# Patient Record
Sex: FEMALE | Race: WHITE | NOT HISPANIC OR LATINO | Employment: FULL TIME | URBAN - METROPOLITAN AREA
[De-identification: names, ages, dates, MRNs, and addresses within clinical notes are randomized per-mention and may not be internally consistent; named-entity substitution may affect disease eponyms.]

---

## 2019-12-18 ENCOUNTER — HOSPITAL ENCOUNTER (EMERGENCY)
Facility: HOSPITAL | Age: 39
Discharge: HOME | End: 2019-12-19
Attending: EMERGENCY MEDICINE
Payer: COMMERCIAL

## 2019-12-18 VITALS
TEMPERATURE: 98.1 F | DIASTOLIC BLOOD PRESSURE: 91 MMHG | RESPIRATION RATE: 18 BRPM | OXYGEN SATURATION: 99 % | SYSTOLIC BLOOD PRESSURE: 200 MMHG | WEIGHT: 272.71 LBS | HEART RATE: 90 BPM

## 2019-12-18 DIAGNOSIS — S09.90XA CLOSED HEAD INJURY, INITIAL ENCOUNTER: ICD-10-CM

## 2019-12-18 DIAGNOSIS — S01.01XA LACERATION OF SCALP, INITIAL ENCOUNTER: ICD-10-CM

## 2019-12-18 DIAGNOSIS — M54.2 NECK PAIN: ICD-10-CM
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
## 2019-12-18 PROCEDURE — 99284 EMERGENCY DEPT VISIT MOD MDM: CPT

## 2019-12-19 ENCOUNTER — APPOINTMENT (EMERGENCY)
Dept: CT IMAGING | Facility: HOSPITAL | Age: 39
End: 2019-12-19
Payer: COMMERCIAL

## 2019-12-19 LAB
EXT PREG TEST URINE: NEGATIVE
EXT. CONTROL ED NAV: NORMAL

## 2019-12-19 PROCEDURE — 70450 CT HEAD/BRAIN W/O DYE: CPT

## 2019-12-19 PROCEDURE — 81025 URINE PREGNANCY TEST: CPT | Performed by: EMERGENCY MEDICINE

## 2019-12-19 PROCEDURE — 72125 CT NECK SPINE W/O DYE: CPT

## 2019-12-19 PROCEDURE — 12002 RPR S/N/AX/GEN/TRNK2.6-7.5CM: CPT | Performed by: EMERGENCY MEDICINE

## 2019-12-19 PROCEDURE — 99284 EMERGENCY DEPT VISIT MOD MDM: CPT | Performed by: EMERGENCY MEDICINE

## 2019-12-19 RX ORDER — ACETAMINOPHEN 325 MG/1
975 TABLET ORAL ONCE
Status: COMPLETED | OUTPATIENT
Start: 2019-12-19 | End: 2019-12-19

## 2019-12-19 RX ORDER — ONDANSETRON 4 MG/1
4 TABLET, ORALLY DISINTEGRATING ORAL ONCE
Status: COMPLETED | OUTPATIENT
Start: 2019-12-19 | End: 2019-12-19

## 2019-12-19 RX ADMIN — ACETAMINOPHEN 975 MG: 325 TABLET ORAL at 00:52

## 2019-12-19 RX ADMIN — ONDANSETRON 4 MG: 4 TABLET, ORALLY DISINTEGRATING ORAL at 00:52

## 2019-12-19 NOTE — ED PROVIDER NOTES
History  Chief Complaint   Patient presents with    Fall     Pt states "slipped on Ice  hit back of head  saw stars  unsure of LOC"     Pt is a 41year old female presenting with fall x tonight  Pt states she slipped on ice and fell backwards striking her head on cement  Unsure if she had LOC but now has generalized headache and midline neck pain  She is wearing a collar at this time  She has a 3 cm laceration of her left occiput  No changes in vision, lightheadedness, dizziness, vomiting  No facial trauma, chest pain, SOB  Ambulating normally  None       History reviewed  No pertinent past medical history  History reviewed  No pertinent surgical history  History reviewed  No pertinent family history  I have reviewed and agree with the history as documented  Social History     Tobacco Use    Smoking status: Never Smoker    Smokeless tobacco: Never Used   Substance Use Topics    Alcohol use: Yes     Comment: social    Drug use: Not Currently        Review of Systems   Constitutional: Negative  HENT: Negative  Eyes: Negative for photophobia and visual disturbance  Respiratory: Negative  Cardiovascular: Negative  Gastrointestinal: Negative  Genitourinary: Negative  Musculoskeletal: Positive for neck pain  Negative for arthralgias, back pain and joint swelling  Skin: Positive for wound  Neurological: Positive for headaches  Negative for dizziness, syncope, facial asymmetry, speech difficulty, weakness, light-headedness and numbness  Physical Exam  Physical Exam   Constitutional: She is oriented to person, place, and time  She appears well-developed and well-nourished  No distress  Cervical collar in place  HENT:   Head: Normocephalic and atraumatic  Right Ear: External ear normal  No hemotympanum  Left Ear: External ear normal  No hemotympanum  Nose: Nose normal    Mouth/Throat: Oropharynx is clear and moist  No oropharyngeal exudate     Eyes: Pupils are equal, round, and reactive to light  Conjunctivae and EOM are normal    Neck: Neck supple  Spinous process tenderness present  No muscular tenderness present  Decreased range of motion present  No edema and no erythema present  Cardiovascular: Normal rate, regular rhythm, normal heart sounds and intact distal pulses  Pulmonary/Chest: Effort normal and breath sounds normal    Neurological: She is alert and oriented to person, place, and time  She has normal strength  No cranial nerve deficit or sensory deficit  She exhibits normal muscle tone  Coordination and gait normal  GCS eye subscore is 4  GCS verbal subscore is 5  GCS motor subscore is 6  Non-focal neuro exam    Skin: Skin is warm and dry  She is not diaphoretic  Vital Signs  ED Triage Vitals   Temperature Pulse Respirations Blood Pressure SpO2   12/18/19 2337 12/18/19 2338 12/18/19 2338 12/18/19 2338 12/18/19 2338   98 1 °F (36 7 °C) 90 18 (!) 200/91 99 %      Temp Source Heart Rate Source Patient Position - Orthostatic VS BP Location FiO2 (%)   12/18/19 2337 -- -- -- --   Oral          Pain Score       12/18/19 2338       6           Vitals:    12/18/19 2338   BP: (!) 200/91   Pulse: 90         Visual Acuity      ED Medications  Medications   acetaminophen (TYLENOL) tablet 975 mg (975 mg Oral Given 12/19/19 0052)   ondansetron (ZOFRAN-ODT) dispersible tablet 4 mg (4 mg Oral Given 12/19/19 0052)       Diagnostic Studies  Results Reviewed     Procedure Component Value Units Date/Time    POCT pregnancy, urine [[id number]]  (Normal) Resulted:  12/19/19 0059    Lab Status:  Final result Updated:  12/19/19 0100     EXT PREG TEST UR (Ref: Negative) negative     Control valid                 CT head without contrast   Final Result by José Antonio Lozada MD (12/19 0124)      No acute intracranial abnormality  Left parieto-occipital scalp hematoma and skin closure staples are noted              Workstation performed: [id number]         CT cervical spine without contrast   Final Result by Ciro Bolaños MD (12/19 0128)      No cervical spine fracture or traumatic malalignment  Workstation performed: [id number]                    Procedures  Laceration repair  Date/Time: 12/19/2019 12:58 AM  Performed by: Tyrone Stone PA-C  Authorized by: Tyrone Stone PA-C   Consent: Verbal consent obtained  Consent given by: patient  Patient identity confirmed: verbally with patient and arm band  Body area: head/neck  Location details: scalp  Laceration length: 3 cm  Foreign bodies: no foreign bodies  Tendon involvement: none  Nerve involvement: none  Vascular damage: no  Anesthesia: local infiltration    Sedation:  Patient sedated: no      Wound Dehiscence:  Superficial Wound Dehiscence: simple closure      Procedure Details:  Preparation: Patient was prepped and draped in the usual sterile fashion  Irrigation solution: saline  Irrigation method: tap  Amount of cleaning: standard  Debridement: none  Degree of undermining: none  Skin closure: staples  Number of sutures: 8  Technique: simple  Approximation: close  Approximation difficulty: simple  Patient tolerance: Patient tolerated the procedure well with no immediate complications               ED Course  ED Course as of Dec 19 0132   Thu Dec 19, 2019   0131 CT both negative  Staples to repair scalp laceration  She is leaving for [facility], and states she will follow up there for removal  Educated on return precautions  MDM      Disposition  Final diagnoses:   Fall, initial encounter   Closed head injury, initial encounter   Neck pain   Laceration of scalp, initial encounter     Time reflects when diagnosis was documented in both MDM as applicable and the Disposition within this note     Time User Action Codes Description Comment    12/19/2019  1:29 AM Marco Antonio OLSON Add [I10  OKVF] Fall, initial encounter     12/19/2019  1:29 AM Marco Antonio Jennings [S09 90XA] Closed head injury, initial encounter     12/19/2019  1:29 AM Raulito OLSON Add [M54 2] Neck pain     12/19/2019  1:29 AM Jian Geiger Add [S01 01XA] Laceration of scalp, initial encounter       ED Disposition     ED Disposition Condition Date/Time Comment    Discharge Good Thu Dec 19, 2019  1:29 AM Moy Adkins discharge to home/self care  Follow-up Information     Follow up With Specialties Details Why Contact Info Additional [address] [facility] Emergency Medicine Go in 1 week For suture removal [facility] [id number]  [address]  , [place], [place], [address]          Patient's Medications    No medications on file     No discharge procedures on file      ED Provider  Electronically Signed by           Mohit Manriquez PA-C  12/19/19 Adrienne Bell PA-C  12/19/19 6007